# Patient Record
Sex: FEMALE | Race: WHITE
[De-identification: names, ages, dates, MRNs, and addresses within clinical notes are randomized per-mention and may not be internally consistent; named-entity substitution may affect disease eponyms.]

---

## 2021-05-18 ENCOUNTER — HOSPITAL ENCOUNTER (OUTPATIENT)
Dept: HOSPITAL 53 - M SFHCWAGY | Age: 26
End: 2021-05-18
Attending: OBSTETRICS & GYNECOLOGY
Payer: COMMERCIAL

## 2021-05-18 DIAGNOSIS — Z3A.00: ICD-10-CM

## 2021-05-18 DIAGNOSIS — O24.419: Primary | ICD-10-CM

## 2021-05-18 PROCEDURE — 87081 CULTURE SCREEN ONLY: CPT

## 2021-05-18 PROCEDURE — 87186 SC STD MICRODIL/AGAR DIL: CPT

## 2021-05-23 ENCOUNTER — HOSPITAL ENCOUNTER (OUTPATIENT)
Dept: HOSPITAL 53 - M LDO | Age: 26
Discharge: HOME | End: 2021-05-23
Attending: OBSTETRICS & GYNECOLOGY
Payer: COMMERCIAL

## 2021-05-23 VITALS — SYSTOLIC BLOOD PRESSURE: 146 MMHG | DIASTOLIC BLOOD PRESSURE: 93 MMHG

## 2021-05-23 VITALS — BODY MASS INDEX: 29.85 KG/M2 | HEIGHT: 64 IN | WEIGHT: 174.83 LBS

## 2021-05-23 VITALS — SYSTOLIC BLOOD PRESSURE: 130 MMHG | DIASTOLIC BLOOD PRESSURE: 90 MMHG

## 2021-05-23 VITALS — SYSTOLIC BLOOD PRESSURE: 117 MMHG | DIASTOLIC BLOOD PRESSURE: 80 MMHG

## 2021-05-23 VITALS — DIASTOLIC BLOOD PRESSURE: 82 MMHG | SYSTOLIC BLOOD PRESSURE: 114 MMHG

## 2021-05-23 VITALS — SYSTOLIC BLOOD PRESSURE: 140 MMHG | DIASTOLIC BLOOD PRESSURE: 93 MMHG

## 2021-05-23 DIAGNOSIS — Z3A.37: ICD-10-CM

## 2021-05-23 DIAGNOSIS — Z88.8: ICD-10-CM

## 2021-05-23 LAB
ALT SERPL W P-5'-P-CCNC: 20 U/L (ref 12–78)
BILIRUB SERPL-MCNC: 0.1 MG/DL (ref 0.2–1)
CREAT SERPL-MCNC: 0.52 MG/DL (ref 0.55–1.3)
GFR SERPL CREATININE-BSD FRML MDRD: > 60 ML/MIN/{1.73_M2} (ref 60–?)
HCT VFR BLD AUTO: 35.5 % (ref 36–47)
HGB BLD-MCNC: 11.2 G/DL (ref 12–15.5)
LDH SERPL L TO P-CCNC: 196 U/L (ref 84–246)
MCH RBC QN AUTO: 27.1 PG (ref 27–33)
MCHC RBC AUTO-ENTMCNC: 31.5 G/DL (ref 32–36.5)
MCV RBC AUTO: 86 FL (ref 80–96)
PLATELET # BLD AUTO: 253 10^3/UL (ref 150–450)
RBC # BLD AUTO: 4.13 10^6/UL (ref 4–5.4)
URATE SERPL-MCNC: 4.6 MG/DL (ref 2.6–6)
WBC # BLD AUTO: 13.3 10^3/UL (ref 4–10)

## 2021-05-23 PROCEDURE — 82565 ASSAY OF CREATININE: CPT

## 2021-05-23 PROCEDURE — 84450 TRANSFERASE (AST) (SGOT): CPT

## 2021-05-23 PROCEDURE — 59025 FETAL NON-STRESS TEST: CPT

## 2021-05-23 PROCEDURE — 83615 LACTATE (LD) (LDH) ENZYME: CPT

## 2021-05-23 PROCEDURE — 84550 ASSAY OF BLOOD/URIC ACID: CPT

## 2021-05-23 PROCEDURE — 84460 ALANINE AMINO (ALT) (SGPT): CPT

## 2021-05-23 PROCEDURE — 85027 COMPLETE CBC AUTOMATED: CPT

## 2021-05-23 PROCEDURE — 82247 BILIRUBIN TOTAL: CPT

## 2021-05-23 PROCEDURE — 59412 ANTEPARTUM MANIPULATION: CPT

## 2021-05-23 NOTE — IPNPDOC
Text Note


Date of Service


The patient was seen on 21.





NOTE


26 yo G1 at 37 2/7 weeks presents for attempt at external cephalic version. C

onsent signed. No contractions.





O: 140/84


NAD


Abd: NT gravid


FHT: cat. I


toco: none





bedside ultrasound: breech, SARA=8.0





Procedure note: Pt placed supine. Ultrasound utilized at bedside. NST category 

one. Abdomen generously coated with lubrication. Two attempts at ECV performed 

by pressing fetal vertex in clockwise direction. The buttocks were elevated at 

the same time. Both attempts were unsuccessful. Pt tolerated the procedure well.

fetal monitoring resumed after attempts. 





A/P 26 yo G1 at 37 3/7 weeks with breech presentation





Consent for  signed.


Ordered PIH labs due to increased BP today.


f-u office 21





VS,Kaylee, I+O


VS, Kaylee, I+O


Laboratory Tests


21 07:51











Vital Signs








  Date Time  Temp Pulse Resp B/P (MAP) Pulse Ox O2 Delivery O2 Flow Rate FiO2


 


21 06:00 98.0 107 16 146/93 (110)  Room Air  

















MASON ROBLEDO MD             May 23, 2021 09:31

## 2021-05-25 ENCOUNTER — HOSPITAL ENCOUNTER (OUTPATIENT)
Dept: HOSPITAL 53 - M LDO | Age: 26
Discharge: HOME | End: 2021-05-25
Attending: OBSTETRICS & GYNECOLOGY
Payer: COMMERCIAL

## 2021-05-25 VITALS — SYSTOLIC BLOOD PRESSURE: 122 MMHG | DIASTOLIC BLOOD PRESSURE: 72 MMHG

## 2021-05-25 VITALS — WEIGHT: 174.61 LBS | HEIGHT: 64 IN | BODY MASS INDEX: 29.81 KG/M2

## 2021-05-25 DIAGNOSIS — O41.03X0: ICD-10-CM

## 2021-05-25 DIAGNOSIS — Z88.8: ICD-10-CM

## 2021-05-25 DIAGNOSIS — Z3A.37: ICD-10-CM

## 2021-05-25 DIAGNOSIS — O24.410: ICD-10-CM

## 2021-05-25 DIAGNOSIS — O36.8130: Primary | ICD-10-CM

## 2021-05-25 PROCEDURE — 59025 FETAL NON-STRESS TEST: CPT

## 2021-05-25 PROCEDURE — 76815 OB US LIMITED FETUS(S): CPT

## 2021-05-25 NOTE — IPNPDOC
Text Note


Date of Service


The patient was seen on 21.





NOTE


Triage Note





Rajani is a 26yo  with SIUP at 37w4d presenting to triage for DFM. She 

called the triage nurse and noted no fetal movement observed since 10pm last 

night. She tried eating and drinking and still did not feel movement. No 

ctx/LOF/vaginal bleeding.





She is scheduled for PLTCS for persistent breech presentation s/p failed ECV and

was noted to have oligohydramnios (SARA of 5cm) at prior visit. She also has 

A1GDM. 





Vitals wnl, afebrile


Gen: WDWN, resting comfortably in bed


Abdomen: soft, gravid, NTTP





Cat I FHRT with +accels, -decels, mod alan


[place]: irregular ctx





TAUS: SIUP with breech presentation (fetal head in maternal LUQ), posterior 

placenta, +FCA, +FM, SARA 9.7cm





Assessment: Rajani is a 26yo  with SIUP at 37w4d with reassuring fetal 

assessment. She is now feeling fetal movement since arrival to triage. Vitals 

wnl, benign exam. Cat I FHRT with SARA 9.7cm.





Plan:


-reassurance provided


-safe for discharge home


-patient has routine OB appt tomorrow, advised to keep


-continue good hydration


-return precautions discussed





Lula Escalona MD





VS,Kaylee, I+O


VSKaylee, I+O





Vital Signs








  Date Time  Temp Pulse Resp B/P (MAP) Pulse Ox O2 Delivery O2 Flow Rate FiO2


 


21 11:38 98.0 111 20 122/72 (89)    

















Lula Esaclona MD           May 25, 2021 12:10

## 2021-05-28 ENCOUNTER — HOSPITAL ENCOUNTER (INPATIENT)
Dept: HOSPITAL 53 - M LDI | Age: 26
LOS: 12 days | Discharge: HOME | DRG: 773 | End: 2021-06-09
Attending: OBSTETRICS & GYNECOLOGY | Admitting: OBSTETRICS & GYNECOLOGY
Payer: COMMERCIAL

## 2021-05-28 VITALS — WEIGHT: 176.59 LBS | BODY MASS INDEX: 30.15 KG/M2 | HEIGHT: 64 IN

## 2021-05-28 DIAGNOSIS — Z3A.39: ICD-10-CM

## 2021-05-28 DIAGNOSIS — O34.211: Primary | ICD-10-CM

## 2021-06-02 ENCOUNTER — HOSPITAL ENCOUNTER (OUTPATIENT)
Dept: HOSPITAL 53 - M LABSMTC | Age: 26
End: 2021-06-02
Attending: ANESTHESIOLOGY
Payer: COMMERCIAL

## 2021-06-02 DIAGNOSIS — Z01.812: Primary | ICD-10-CM

## 2021-06-02 DIAGNOSIS — Z20.822: ICD-10-CM

## 2021-06-07 VITALS — DIASTOLIC BLOOD PRESSURE: 86 MMHG | SYSTOLIC BLOOD PRESSURE: 138 MMHG

## 2021-06-07 VITALS — SYSTOLIC BLOOD PRESSURE: 139 MMHG | DIASTOLIC BLOOD PRESSURE: 79 MMHG

## 2021-06-07 VITALS — DIASTOLIC BLOOD PRESSURE: 83 MMHG | SYSTOLIC BLOOD PRESSURE: 129 MMHG

## 2021-06-07 VITALS — DIASTOLIC BLOOD PRESSURE: 76 MMHG | SYSTOLIC BLOOD PRESSURE: 127 MMHG

## 2021-06-07 VITALS — DIASTOLIC BLOOD PRESSURE: 89 MMHG | SYSTOLIC BLOOD PRESSURE: 134 MMHG

## 2021-06-07 VITALS — DIASTOLIC BLOOD PRESSURE: 76 MMHG | SYSTOLIC BLOOD PRESSURE: 137 MMHG

## 2021-06-07 VITALS — DIASTOLIC BLOOD PRESSURE: 78 MMHG | SYSTOLIC BLOOD PRESSURE: 136 MMHG

## 2021-06-07 VITALS — DIASTOLIC BLOOD PRESSURE: 65 MMHG | SYSTOLIC BLOOD PRESSURE: 138 MMHG

## 2021-06-07 LAB
HCT VFR BLD AUTO: 35.2 % (ref 36–47)
HGB BLD-MCNC: 10.9 G/DL (ref 12–15.5)
MCH RBC QN AUTO: 26 PG (ref 27–33)
MCHC RBC AUTO-ENTMCNC: 31 G/DL (ref 32–36.5)
MCV RBC AUTO: 83.8 FL (ref 80–96)
PLATELET # BLD AUTO: 257 10^3/UL (ref 150–450)
RBC # BLD AUTO: 4.2 10^6/UL (ref 4–5.4)
WBC # BLD AUTO: 11.4 10^3/UL (ref 4–10)

## 2021-06-07 RX ADMIN — KETOROLAC TROMETHAMINE SCH MG: 30 INJECTION, SOLUTION INTRAMUSCULAR at 22:34

## 2021-06-07 RX ADMIN — DOCUSATE SODIUM PRN MG: 100 CAPSULE, LIQUID FILLED ORAL at 22:34

## 2021-06-07 RX ADMIN — KETOROLAC TROMETHAMINE SCH MG: 30 INJECTION, SOLUTION INTRAMUSCULAR at 16:54

## 2021-06-07 NOTE — ROOPDOC
[facility] Report Of Operation


Report of Operation


DATE OF PROCEDURE: 21





Report of operation





Preoperative diagnosis: 39 0/7 weeks, breech





Postoperative diagnosis: same





Procedure: Repeat low transverse  section.





Surgeon: Mason Robledo M.D.





Asst.: Sarah Smith CNM





EBL: 500 ml.





Urine output: 100 mL's.





Findings: 7 lbs. 0 oz. male infant, tip breech,  Apgar's 9 and 9 g normal 

uterus, fallopian tubes, ovaries.





Operative summary: Patient taken to the operating room where spinal anesthesia 

was induced. She was prepped draped sterile fashion in the supine position. A 

Barnes catheter was placed. A Pfannenstiel skin incision was made with scalpel. 

Fascia was incised and extended bilaterally. The peritoneal cavity was entered. 

A Mobius retractor was placed. A bladder flap was created. A curvilinear 

incision was made in lower uterine segment until Clear fluid was noted. The 

incision was extended manually. The infant was delivered from the breech 

position without difficulty. Cord was double clamped and cut. The infant was 

handed to awaiting nurses.  The placenta was expressed. Uterus was closed with 

O-Vicryl in a running locked fashion. A second imbricating layer of Vicryl was 

placed.





Peritoneum was closed with 2-0 Vicryl a running fashion. Fascia was closed with 

0 Vicryl in running fashion. Skin was closed 4-0 Monocryl subcuticular sutures. 

Sponge, instrument and needle counts were correct.





Sarah Smith CNM, assisted with all aspects of the procedure. She helped each 

layer of the incision and deliver the fetus.











MASON ROBLEDO MD              2021 11:24

## 2021-06-08 VITALS — DIASTOLIC BLOOD PRESSURE: 58 MMHG | SYSTOLIC BLOOD PRESSURE: 116 MMHG

## 2021-06-08 VITALS — DIASTOLIC BLOOD PRESSURE: 72 MMHG | SYSTOLIC BLOOD PRESSURE: 128 MMHG

## 2021-06-08 VITALS — DIASTOLIC BLOOD PRESSURE: 73 MMHG | SYSTOLIC BLOOD PRESSURE: 118 MMHG

## 2021-06-08 VITALS — DIASTOLIC BLOOD PRESSURE: 74 MMHG | SYSTOLIC BLOOD PRESSURE: 107 MMHG

## 2021-06-08 VITALS — DIASTOLIC BLOOD PRESSURE: 68 MMHG | SYSTOLIC BLOOD PRESSURE: 114 MMHG

## 2021-06-08 VITALS — DIASTOLIC BLOOD PRESSURE: 67 MMHG | SYSTOLIC BLOOD PRESSURE: 110 MMHG

## 2021-06-08 LAB
HCT VFR BLD AUTO: 30.1 % (ref 36–47)
HGB BLD-MCNC: 9.3 G/DL (ref 12–15.5)
MCH RBC QN AUTO: 26.1 PG (ref 27–33)
MCHC RBC AUTO-ENTMCNC: 30.9 G/DL (ref 32–36.5)
MCV RBC AUTO: 84.3 FL (ref 80–96)
PLATELET # BLD AUTO: 242 10^3/UL (ref 150–450)
RBC # BLD AUTO: 3.57 10^6/UL (ref 4–5.4)
WBC # BLD AUTO: 18.1 10^3/UL (ref 4–10)

## 2021-06-08 RX ADMIN — SIMETHICONE PRN MG: 80 TABLET, CHEWABLE ORAL at 16:48

## 2021-06-08 RX ADMIN — DOCUSATE SODIUM PRN MG: 100 CAPSULE, LIQUID FILLED ORAL at 19:51

## 2021-06-08 RX ADMIN — Medication SCH TAB: at 08:03

## 2021-06-08 RX ADMIN — KETOROLAC TROMETHAMINE SCH MG: 30 INJECTION, SOLUTION INTRAMUSCULAR at 04:50

## 2021-06-08 RX ADMIN — SIMETHICONE PRN MG: 80 TABLET, CHEWABLE ORAL at 10:18

## 2021-06-08 RX ADMIN — IBUPROFEN SCH MG: 800 TABLET, FILM COATED ORAL at 19:51

## 2021-06-08 RX ADMIN — IBUPROFEN SCH MG: 800 TABLET, FILM COATED ORAL at 12:39

## 2021-06-08 NOTE — IPNPDOC
Postpartum Progress Note


Date of Service:  2021


Postpartum Day#:  1


Postpartum Progress Note


SUBJECT: Rajani is a 25-year-old female who had a primary  section for 

breech presentation. She has been ambulating, voiding spontaneously without 

issue and tolerating regular diet. Breast feeding without issue. 





OBJECTIVE: 


VITAL SIGNS: Within normal limits, afebrile.


Alert and oriented times three.


Breath sounds clear to auscultation.


Abdomen: Fundus firm at U-2. Dressing intact without erythema surrounding it.


Minimal lochia.





ASSESSMENT: Day 1 postoperative for malpresentation





PLAN:


1. Continue supportive nursing care


2. Anticipate discharge to home tomorrow. 


3. Patient to shower and ambulate today.





VS, I&O, 24H, Fishbone


Vital Signs/I&O





Vital Signs








  Date Time  Temp Pulse Resp B/P (MAP) Pulse Ox O2 Delivery O2 Flow Rate FiO2


 


21 06:00 98.4 94 16 116/58 (77) 97 Room Air  














I&O- Last 24 Hours up to 6 AM 


 


 21





 06:00


 


Intake Total 3855 ml


 


Output Total 4450 ml


 


Balance -595 ml











Laboratory Data


24H LABS


Laboratory Tests 2


21 08:44: Bedside Glucose (Misc Panel) 75


21 08:46: 


Nucleated Red Blood Cells % (auto) 0.0, Syphilis Serology NONREACTIVE


21 06:41: Nucleated Red Blood Cells % (auto) 0.0


CBC/BMP


Laboratory Tests


21 08:46








21 06:41

















JASON VÁZQUEZ CNM             2021 07:52

## 2021-06-09 VITALS — DIASTOLIC BLOOD PRESSURE: 68 MMHG | SYSTOLIC BLOOD PRESSURE: 119 MMHG

## 2021-06-09 VITALS — DIASTOLIC BLOOD PRESSURE: 79 MMHG | SYSTOLIC BLOOD PRESSURE: 132 MMHG

## 2021-06-09 VITALS — SYSTOLIC BLOOD PRESSURE: 140 MMHG | DIASTOLIC BLOOD PRESSURE: 75 MMHG

## 2021-06-09 RX ADMIN — Medication SCH TAB: at 11:52

## 2021-06-09 RX ADMIN — IBUPROFEN SCH MG: 800 TABLET, FILM COATED ORAL at 04:51

## 2021-06-09 RX ADMIN — IBUPROFEN SCH MG: 800 TABLET, FILM COATED ORAL at 13:26

## 2021-06-09 RX ADMIN — DOCUSATE SODIUM PRN MG: 100 CAPSULE, LIQUID FILLED ORAL at 16:29

## 2021-06-09 NOTE — DS.PDOC
Discharge Summary


General


Date of Admission


2021 at 07:25


Date of Discharge


21





Discharge Summary


DATE OF ADMISSION: 2021


DATE OF DISCHARGE: 2021





ADMISSION DIAGNOSIS:. Breech presentation 39+ weeks gestation





DISCHARGE DIAGNOSIS: Same, status post primary low transverse  section





DISCHARGE SUMMARY: The patient was admitted at, 39+ weeks gestation with a 

diagnosis of breech presentation.  The scheduled  section delivery was 

uncomplicated. Her postoperative course was uncomplicated as well. On 

postoperative day #2, she was meeting all discharge criteria. 





PHYSICAL EXAMINATION ON DATE OF DISCHARGE: 


Normotensive. Normal heart rate. Afebrile. 


HEART:  Regular rate and rhythm. No murmurs, gallops, or rubs.


LUNGS: Clear to auscultation bilaterally. 


ABDOMEN:  Soft, nontender, nondistended. 


Incision bandage clean and dry. 


EXTREMITIES: Nonedematous, nontender. 





She was meeting all discharge criteria on postoperative day #2.  We reviewed 

routine fever, infectious, pain, and bleeding precautions. She is to followup in

2 weeks for incision check. Her postoperative medications are Percocet, Motrin, 

and Colace.





Vital Signs/I&Os





Vital Signs








  Date Time  Temp Pulse Resp B/P (MAP) Pulse Ox O2 Delivery O2 Flow Rate FiO2


 


21 02:00 97.9 93 16 119/68 (85) 98 Room Air  











Laboratory Data


Labs 24H


Laboratory Tests 2


21 06:41: Nucleated Red Blood Cells % (auto) 0.0


CBC/BMP


Laboratory Tests


21 06:41











Discharge Medications


Scheduled


Ibuprofen (Ibuprofen) 800 Mg Tablet, 800 MG PO Q8H


Prenatal No.137/Iron/Folic Acd (Prenatal Vitamin Tablet) 1 Each Tablet, 1 TAB PO

DAILY, (Reported)





Scheduled PRN


Docusate Sodium (Dok) 100 Mg Capsule, 100 MG PO QHSP PRN for CONSTIPATION


Oxycodone HCl/Acetaminophen (Oxycodone-Acetaminophen 5-325) 1 Each Tablet, 1 TAB

PO TIDP PRN for pain





Miscellaneous Medications


Cetirizine HCl/Pseudoephedrine (Zyrtec-D Tablet) 1 Each Tab.er.12h, 1 TAB PO, 

(Reported)





Allergies


Coded Allergies:  


     cefdinir (Verified  Allergy, Intermediate, c-diff, 10/25/20)











TIMOTHY SCHAFFER DO            2021 05:48